# Patient Record
Sex: FEMALE | Race: WHITE | NOT HISPANIC OR LATINO
[De-identification: names, ages, dates, MRNs, and addresses within clinical notes are randomized per-mention and may not be internally consistent; named-entity substitution may affect disease eponyms.]

---

## 2017-01-06 ENCOUNTER — RESULT REVIEW (OUTPATIENT)
Age: 42
End: 2017-01-06

## 2017-01-06 ENCOUNTER — APPOINTMENT (OUTPATIENT)
Dept: HEMATOLOGY ONCOLOGY | Facility: CLINIC | Age: 42
End: 2017-01-06

## 2017-01-06 VITALS
DIASTOLIC BLOOD PRESSURE: 80 MMHG | BODY MASS INDEX: 31.34 KG/M2 | RESPIRATION RATE: 14 BRPM | SYSTOLIC BLOOD PRESSURE: 106 MMHG | HEART RATE: 76 BPM | HEIGHT: 61 IN | WEIGHT: 166 LBS | TEMPERATURE: 97.2 F

## 2017-01-06 LAB
BASOPHILS # BLD: 0.08 TH/MM3
BASOPHILS NFR BLD: 1.3 %
EOSINOPHIL # BLD: 0.14 TH/MM3
EOSINOPHIL NFR BLD: 2.3 %
ERYTHROCYTE [DISTWIDTH] IN BLOOD BY AUTOMATED COUNT: 13.8 %
GRANULOCYTES # BLD: 3.66 TH/MM3
GRANULOCYTES NFR BLD: 61.4 %
HCT VFR BLD AUTO: 37.3 %
HGB BLD-MCNC: 12.4 G/DL
IMM GRANULOCYTES # BLD: 0 TH/MM3
IMM GRANULOCYTES NFR BLD: 0 %
LYMPHOCYTES # BLD: 1.68 TH/MM3
LYMPHOCYTES NFR BLD: 28.1 %
MCH RBC QN AUTO: 27.8 PG
MCHC RBC AUTO-ENTMCNC: 33.2 G/DL
MCV RBC AUTO: 83.6 FL
MONOCYTES # BLD: 0.41 TH/MM3
MONOCYTES NFR BLD: 6.9 %
PLATELET # BLD: 252 TH/MM3
PMV BLD AUTO: 11.4 FL
RBC # BLD AUTO: 4.46 MIL/MM3
RETICS/RBC NFR: 0.72 %
WBC # BLD: 5.97 TH/MM3

## 2017-01-09 ENCOUNTER — MESSAGE (OUTPATIENT)
Age: 42
End: 2017-01-09

## 2017-01-09 LAB
FERRITIN SERPL-MCNC: 10 NG/ML
FOLATE SERPL-MCNC: 14.8 NG/ML
IRON SERPL-MCNC: 47 UG/DL
PERCENT SATURATION (NORTH): 11.3 %
TIBC SERPL-MCNC: 417 UG/DL
TSH SERPL DL<=0.005 MIU/L-ACNC: 2.62 UU/ML
VIT B12 SERPL-MCNC: 171 PG/ML

## 2017-01-12 ENCOUNTER — APPOINTMENT (OUTPATIENT)
Dept: INFUSION THERAPY | Facility: CLINIC | Age: 42
End: 2017-01-12

## 2017-01-12 VITALS
HEART RATE: 81 BPM | TEMPERATURE: 97.3 F | RESPIRATION RATE: 18 BRPM | DIASTOLIC BLOOD PRESSURE: 76 MMHG | SYSTOLIC BLOOD PRESSURE: 104 MMHG

## 2017-01-16 LAB — METHYLMALONATE SERPL-SCNC: 241 NMOL/L

## 2017-01-18 LAB
IF AB SER QL: NEGATIVE
METHYLMALONATE SERPL-SCNC: 265 NMOL/L
PCA AB SER QL: POSITIVE
PCA AB TITR SER: ABNORMAL TITER

## 2017-01-20 ENCOUNTER — APPOINTMENT (OUTPATIENT)
Dept: INFUSION THERAPY | Facility: CLINIC | Age: 42
End: 2017-01-20

## 2017-01-26 ENCOUNTER — APPOINTMENT (OUTPATIENT)
Dept: INFUSION THERAPY | Facility: CLINIC | Age: 42
End: 2017-01-26

## 2017-01-27 ENCOUNTER — APPOINTMENT (OUTPATIENT)
Dept: INFUSION THERAPY | Facility: CLINIC | Age: 42
End: 2017-01-27

## 2017-02-02 ENCOUNTER — APPOINTMENT (OUTPATIENT)
Dept: INFUSION THERAPY | Facility: CLINIC | Age: 42
End: 2017-02-02

## 2017-02-03 ENCOUNTER — APPOINTMENT (OUTPATIENT)
Dept: INFUSION THERAPY | Facility: CLINIC | Age: 42
End: 2017-02-03

## 2017-02-09 ENCOUNTER — APPOINTMENT (OUTPATIENT)
Dept: INFUSION THERAPY | Facility: CLINIC | Age: 42
End: 2017-02-09

## 2017-02-10 ENCOUNTER — APPOINTMENT (OUTPATIENT)
Dept: INFUSION THERAPY | Facility: CLINIC | Age: 42
End: 2017-02-10

## 2017-02-10 VITALS
HEART RATE: 76 BPM | RESPIRATION RATE: 18 BRPM | SYSTOLIC BLOOD PRESSURE: 116 MMHG | DIASTOLIC BLOOD PRESSURE: 80 MMHG | TEMPERATURE: 98.2 F

## 2017-02-17 ENCOUNTER — OUTPATIENT (OUTPATIENT)
Dept: OUTPATIENT SERVICES | Facility: HOSPITAL | Age: 42
LOS: 1 days | Discharge: HOME | End: 2017-02-17

## 2017-02-17 ENCOUNTER — APPOINTMENT (OUTPATIENT)
Dept: INFUSION THERAPY | Facility: CLINIC | Age: 42
End: 2017-02-17

## 2017-02-24 ENCOUNTER — APPOINTMENT (OUTPATIENT)
Dept: HEMATOLOGY ONCOLOGY | Facility: CLINIC | Age: 42
End: 2017-02-24

## 2017-03-03 ENCOUNTER — APPOINTMENT (OUTPATIENT)
Dept: HEMATOLOGY ONCOLOGY | Facility: CLINIC | Age: 42
End: 2017-03-03

## 2017-04-07 ENCOUNTER — APPOINTMENT (OUTPATIENT)
Dept: HEMATOLOGY ONCOLOGY | Facility: CLINIC | Age: 42
End: 2017-04-07

## 2017-06-27 DIAGNOSIS — D50.9 IRON DEFICIENCY ANEMIA, UNSPECIFIED: ICD-10-CM

## 2017-06-27 DIAGNOSIS — Z98.84 BARIATRIC SURGERY STATUS: ICD-10-CM

## 2017-06-27 DIAGNOSIS — K91.2 POSTSURGICAL MALABSORPTION, NOT ELSEWHERE CLASSIFIED: ICD-10-CM

## 2017-08-03 ENCOUNTER — APPOINTMENT (OUTPATIENT)
Dept: HEMATOLOGY ONCOLOGY | Facility: CLINIC | Age: 42
End: 2017-08-03

## 2017-08-03 ENCOUNTER — OUTPATIENT (OUTPATIENT)
Dept: OUTPATIENT SERVICES | Facility: HOSPITAL | Age: 42
LOS: 1 days | Discharge: HOME | End: 2017-08-03

## 2017-08-03 VITALS
HEIGHT: 61 IN | TEMPERATURE: 98.6 F | WEIGHT: 160 LBS | RESPIRATION RATE: 14 BRPM | BODY MASS INDEX: 30.21 KG/M2 | HEART RATE: 81 BPM | DIASTOLIC BLOOD PRESSURE: 89 MMHG | SYSTOLIC BLOOD PRESSURE: 127 MMHG

## 2017-08-03 LAB
BASOPHILS # BLD: 0.08 TH/MM3
BASOPHILS # BLD: 0.09 TH/MM3
BASOPHILS NFR BLD: 1.2 %
BASOPHILS NFR BLD: 1.2 %
EOSINOPHIL # BLD: 0.13 TH/MM3
EOSINOPHIL # BLD: 0.16 TH/MM3
EOSINOPHIL NFR BLD: 1.9 %
EOSINOPHIL NFR BLD: 2.1 %
ERYTHROCYTE [DISTWIDTH] IN BLOOD BY AUTOMATED COUNT: 13.7 %
ERYTHROCYTE [DISTWIDTH] IN BLOOD BY AUTOMATED COUNT: 13.8 %
GRANULOCYTES # BLD: 4.64 TH/MM3
GRANULOCYTES # BLD: 4.95 TH/MM3
GRANULOCYTES NFR BLD: 64.9 %
GRANULOCYTES NFR BLD: 66.8 %
HCT VFR BLD AUTO: 39.9 %
HCT VFR BLD AUTO: 40.6 %
HGB BLD-MCNC: 13.3 G/DL
HGB BLD-MCNC: 13.7 G/DL
IMM GRANULOCYTES # BLD: 0 TH/MM3
IMM GRANULOCYTES # BLD: 0.03 TH/MM3
IMM GRANULOCYTES NFR BLD: 0 %
IMM GRANULOCYTES NFR BLD: 0.4 %
LYMPHOCYTES # BLD: 1.57 TH/MM3
LYMPHOCYTES # BLD: 1.79 TH/MM3
LYMPHOCYTES NFR BLD: 22.6 %
LYMPHOCYTES NFR BLD: 23.5 %
MCH RBC QN AUTO: 28.4 PG
MCH RBC QN AUTO: 28.5 PG
MCHC RBC AUTO-ENTMCNC: 33.3 G/DL
MCHC RBC AUTO-ENTMCNC: 33.7 G/DL
MCV RBC AUTO: 84.4 FL
MCV RBC AUTO: 85.1 FL
MONOCYTES # BLD: 0.52 TH/MM3
MONOCYTES # BLD: 0.6 TH/MM3
MONOCYTES NFR BLD: 7.5 %
MONOCYTES NFR BLD: 7.9 %
PLATELET # BLD: 209 TH/MM3
PLATELET # BLD: 215 TH/MM3
PMV BLD AUTO: 11.5 FL
PMV BLD AUTO: 11.8 FL
RBC # BLD AUTO: 4.69 MIL/MM3
RBC # BLD AUTO: 4.81 MIL/MM3
RETICS/RBC NFR: 1 %
WBC # BLD: 6.94 TH/MM3
WBC # BLD: 7.62 TH/MM3

## 2017-08-04 DIAGNOSIS — D50.9 IRON DEFICIENCY ANEMIA, UNSPECIFIED: ICD-10-CM

## 2017-08-04 LAB
FERRITIN SERPL-MCNC: 15 NG/ML
FOLATE SERPL-MCNC: 11.9 NG/ML
IRON SERPL-MCNC: 60 UG/DL
LACTATE DEHYDROGENASE (NORTH): 166 IU/L
PERCENT SATURATION (NORTH): 15 %
TIBC SERPL-MCNC: 399 UG/DL
VIT B12 SERPL-MCNC: 134 PG/ML

## 2017-08-08 ENCOUNTER — RX RENEWAL (OUTPATIENT)
Age: 42
End: 2017-08-08

## 2017-08-08 LAB — METHYLMALONATE SERPL-SCNC: 236 NMOL/L

## 2017-08-09 ENCOUNTER — RX RENEWAL (OUTPATIENT)
Age: 42
End: 2017-08-09

## 2017-09-05 ENCOUNTER — OUTPATIENT (OUTPATIENT)
Dept: OUTPATIENT SERVICES | Facility: HOSPITAL | Age: 42
LOS: 1 days | Discharge: HOME | End: 2017-09-05

## 2017-09-05 ENCOUNTER — APPOINTMENT (OUTPATIENT)
Dept: HEMATOLOGY ONCOLOGY | Facility: CLINIC | Age: 42
End: 2017-09-05

## 2017-09-05 VITALS
HEIGHT: 61 IN | BODY MASS INDEX: 30.21 KG/M2 | TEMPERATURE: 97.9 F | WEIGHT: 160 LBS | DIASTOLIC BLOOD PRESSURE: 76 MMHG | RESPIRATION RATE: 14 BRPM | SYSTOLIC BLOOD PRESSURE: 116 MMHG | HEART RATE: 63 BPM

## 2017-09-06 LAB
BASOPHILS # BLD: 0.06 TH/MM3
BASOPHILS NFR BLD: 1.1 %
EOSINOPHIL # BLD: 0.1 TH/MM3
EOSINOPHIL NFR BLD: 1.8 %
ERYTHROCYTE [DISTWIDTH] IN BLOOD BY AUTOMATED COUNT: 13.8 %
GRANULOCYTES # BLD: 3.8 TH/MM3
GRANULOCYTES NFR BLD: 66.6 %
HCT VFR BLD AUTO: 39 %
HGB BLD-MCNC: 13.1 G/DL
IMM GRANULOCYTES # BLD: 0.02 TH/MM3
IMM GRANULOCYTES NFR BLD: 0.4 %
LYMPHOCYTES # BLD: 1.28 TH/MM3
LYMPHOCYTES NFR BLD: 22.5 %
MCH RBC QN AUTO: 27.8 PG
MCHC RBC AUTO-ENTMCNC: 33.6 G/DL
MCV RBC AUTO: 82.8 FL
MONOCYTES # BLD: 0.43 TH/MM3
MONOCYTES NFR BLD: 7.6 %
PLATELET # BLD: 138 TH/MM3
PMV BLD AUTO: 12 FL
RBC # BLD AUTO: 4.71 MIL/MM3
WBC # BLD: 5.69 TH/MM3

## 2017-10-02 DIAGNOSIS — D50.9 IRON DEFICIENCY ANEMIA, UNSPECIFIED: ICD-10-CM

## 2017-10-02 DIAGNOSIS — E53.8 DEFICIENCY OF OTHER SPECIFIED B GROUP VITAMINS: ICD-10-CM

## 2017-10-17 ENCOUNTER — APPOINTMENT (OUTPATIENT)
Dept: HEMATOLOGY ONCOLOGY | Facility: CLINIC | Age: 42
End: 2017-10-17

## 2017-10-24 ENCOUNTER — APPOINTMENT (OUTPATIENT)
Dept: HEMATOLOGY ONCOLOGY | Facility: CLINIC | Age: 42
End: 2017-10-24

## 2018-01-10 ENCOUNTER — APPOINTMENT (OUTPATIENT)
Dept: HEMATOLOGY ONCOLOGY | Facility: CLINIC | Age: 43
End: 2018-01-10

## 2018-01-12 ENCOUNTER — APPOINTMENT (OUTPATIENT)
Dept: HEMATOLOGY ONCOLOGY | Facility: CLINIC | Age: 43
End: 2018-01-12

## 2018-01-12 ENCOUNTER — APPOINTMENT (OUTPATIENT)
Dept: INFUSION THERAPY | Facility: CLINIC | Age: 43
End: 2018-01-12

## 2018-01-12 ENCOUNTER — RESULT REVIEW (OUTPATIENT)
Age: 43
End: 2018-01-12

## 2018-01-12 VITALS
DIASTOLIC BLOOD PRESSURE: 72 MMHG | WEIGHT: 159 LBS | SYSTOLIC BLOOD PRESSURE: 109 MMHG | HEART RATE: 73 BPM | TEMPERATURE: 96.8 F | BODY MASS INDEX: 30.02 KG/M2 | HEIGHT: 61 IN | RESPIRATION RATE: 14 BRPM

## 2018-01-13 LAB
FERRITIN SERPL-MCNC: 7 NG/ML
VIT B12 SERPL-MCNC: > 2000 PG/ML

## 2018-01-15 LAB
BASOPHILS # BLD: 0.1 TH/MM3
BASOPHILS NFR BLD: 1.6 %
EOSINOPHIL # BLD: 0.16 TH/MM3
EOSINOPHIL NFR BLD: 2.5 %
ERYTHROCYTE [DISTWIDTH] IN BLOOD BY AUTOMATED COUNT: 14.4 %
GRANULOCYTES # BLD: 3.73 TH/MM3
GRANULOCYTES NFR BLD: 59.3 %
HCT VFR BLD AUTO: 33.2 %
HGB BLD-MCNC: 10.6 G/DL
IMM GRANULOCYTES # BLD: 0.02 TH/MM3
IMM GRANULOCYTES NFR BLD: 0.3 %
IRON SERPL-MCNC: 17 UG/DL
LYMPHOCYTES # BLD: 1.79 TH/MM3
LYMPHOCYTES NFR BLD: 28.4 %
MCH RBC QN AUTO: 23.7 PG
MCHC RBC AUTO-ENTMCNC: 31.9 G/DL
MCV RBC AUTO: 74.1 FL
MONOCYTES # BLD: 0.5 TH/MM3
MONOCYTES NFR BLD: 7.9 %
PLATELET # BLD: 220 TH/MM3
PMV BLD AUTO: 12.1 FL
RBC # BLD AUTO: 4.48 MIL/MM3
TIBC SERPL-MCNC: 438 UG/DL
WBC # BLD: 6.3 TH/MM3

## 2018-01-17 ENCOUNTER — APPOINTMENT (OUTPATIENT)
Dept: INFUSION THERAPY | Facility: CLINIC | Age: 43
End: 2018-01-17

## 2018-01-17 ENCOUNTER — APPOINTMENT (OUTPATIENT)
Dept: HEMATOLOGY ONCOLOGY | Facility: CLINIC | Age: 43
End: 2018-01-17

## 2018-01-19 LAB — METHYLMALONATE SERPL-SCNC: 123 NMOL/L

## 2018-01-24 ENCOUNTER — APPOINTMENT (OUTPATIENT)
Dept: INFUSION THERAPY | Facility: CLINIC | Age: 43
End: 2018-01-24

## 2018-01-31 ENCOUNTER — APPOINTMENT (OUTPATIENT)
Dept: INFUSION THERAPY | Facility: CLINIC | Age: 43
End: 2018-01-31

## 2018-02-14 ENCOUNTER — APPOINTMENT (OUTPATIENT)
Dept: INFUSION THERAPY | Facility: CLINIC | Age: 43
End: 2018-02-14

## 2018-02-14 ENCOUNTER — OUTPATIENT (OUTPATIENT)
Dept: OUTPATIENT SERVICES | Facility: HOSPITAL | Age: 43
LOS: 1 days | Discharge: HOME | End: 2018-02-14

## 2018-02-14 DIAGNOSIS — E53.8 DEFICIENCY OF OTHER SPECIFIED B GROUP VITAMINS: ICD-10-CM

## 2018-02-14 RX ORDER — ACETAMINOPHEN 500 MG
650 TABLET ORAL ONCE
Qty: 0 | Refills: 0 | Status: COMPLETED | OUTPATIENT
Start: 2018-02-14 | End: 2018-02-14

## 2018-02-14 RX ORDER — IRON SUCROSE 20 MG/ML
200 INJECTION, SOLUTION INTRAVENOUS ONCE
Qty: 0 | Refills: 0 | Status: COMPLETED | OUTPATIENT
Start: 2018-02-14 | End: 2018-02-14

## 2018-02-14 RX ADMIN — IRON SUCROSE 260 MILLIGRAM(S): 20 INJECTION, SOLUTION INTRAVENOUS at 17:31

## 2018-02-14 RX ADMIN — Medication 650 MILLIGRAM(S): at 17:32

## 2018-02-21 ENCOUNTER — APPOINTMENT (OUTPATIENT)
Dept: HEMATOLOGY ONCOLOGY | Facility: CLINIC | Age: 43
End: 2018-02-21

## 2018-04-04 ENCOUNTER — APPOINTMENT (OUTPATIENT)
Dept: HEMATOLOGY ONCOLOGY | Facility: CLINIC | Age: 43
End: 2018-04-04

## 2018-04-24 ENCOUNTER — RESULT REVIEW (OUTPATIENT)
Age: 43
End: 2018-04-24

## 2018-11-20 ENCOUNTER — APPOINTMENT (OUTPATIENT)
Dept: HEMATOLOGY ONCOLOGY | Facility: CLINIC | Age: 43
End: 2018-11-20

## 2019-01-02 ENCOUNTER — LABORATORY RESULT (OUTPATIENT)
Age: 44
End: 2019-01-02

## 2019-01-02 ENCOUNTER — APPOINTMENT (OUTPATIENT)
Dept: HEMATOLOGY ONCOLOGY | Facility: CLINIC | Age: 44
End: 2019-01-02

## 2019-01-02 VITALS
TEMPERATURE: 97.4 F | BODY MASS INDEX: 32.85 KG/M2 | HEIGHT: 61 IN | SYSTOLIC BLOOD PRESSURE: 116 MMHG | HEART RATE: 79 BPM | DIASTOLIC BLOOD PRESSURE: 82 MMHG | WEIGHT: 174 LBS | RESPIRATION RATE: 14 BRPM

## 2019-01-02 NOTE — HISTORY OF PRESENT ILLNESS
[de-identified] : Radha has h/o iron deficiency and B12 deficiency, she is a former patient of Dr. Rivero and presents today for follow up visit. She has no new complaints today. As per Dr. Rivero's note she has had a negative GI evaluation in the past. On 8/3/2017 her CBC and iron studies, were WNL, B12 level was low. 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 She also reports heavy menstrual blood loss.  [de-identified] : 9/5/2017: Radah presents for follow up. She is a former patient of Dr. Rivero. She has h/o B12 deficiency and receives B12 injections.  \par \par 1/12/2018: Will repeat labwork today and offer iron supplementation with Venofer starting today, as I am suspecting she is again deficient in iron. She continues on B12 supplementation, she self-injects at home. \par \par 1/2/2/2019: Since her last visit Radha has received 5 doses of Venofer for iron deficiency. She also had several missed appointments. She reports she has not been injecting B12, just taking oral supplementation. She was out of syringes. She reports she has had extensive GI work up in the past and was told that she has problems with malabsorption. She reports shortness of breath, fatigue today.

## 2019-01-02 NOTE — PHYSICAL EXAM
[Fully active, able to carry on all pre-disease performance without restriction] : Status 0 - Fully active, able to carry on all pre-disease performance without restriction [Normal] : normoactive bowel sounds, soft and nontender, no hepatosplenomegaly or masses appreciated

## 2019-01-02 NOTE — ASSESSMENT
[FreeTextEntry1] : History of B12 deficiency and iron deficiency, patient is on B12 supplementation (not taking) and has received IV iron in the past.\par --CBC today, iron studies, B12 level today\par --Start on iron supplementation with Venofer\par --Continue on home B12 supplementation, injection will be prescribed with syringes\par \par -- Follow up in 3 months\par She will follow up with her PMD and undergo age appropriate screening. \par \par

## 2019-01-03 ENCOUNTER — RX RENEWAL (OUTPATIENT)
Age: 44
End: 2019-01-03

## 2019-01-04 LAB
ALBUMIN SERPL ELPH-MCNC: 4.4 G/DL
ALP BLD-CCNC: 95 U/L
ALT SERPL-CCNC: 26 U/L
ANION GAP SERPL CALC-SCNC: 15 MMOL/L
AST SERPL-CCNC: 22 U/L
BILIRUB SERPL-MCNC: <0.2 MG/DL
BUN SERPL-MCNC: 12 MG/DL
CALCIUM SERPL-MCNC: 9.1 MG/DL
CHLORIDE SERPL-SCNC: 102 MMOL/L
CO2 SERPL-SCNC: 24 MMOL/L
CREAT SERPL-MCNC: 0.6 MG/DL
FERRITIN SERPL-MCNC: 4 NG/ML
FOLATE SERPL-MCNC: 18.7 NG/ML
GLUCOSE SERPL-MCNC: 76 MG/DL
HCT VFR BLD CALC: 31.8 %
HGB BLD-MCNC: 9.6 G/DL
IRON SATN MFR SERPL: 4 %
IRON SERPL-MCNC: 17 UG/DL
MCHC RBC-ENTMCNC: 21.8 PG
MCHC RBC-ENTMCNC: 30.2 G/DL
MCV RBC AUTO: 72.1 FL
PLATELET # BLD AUTO: 284 K/UL
PMV BLD: 10.9 FL
POTASSIUM SERPL-SCNC: 4.8 MMOL/L
PROT SERPL-MCNC: 6.8 G/DL
RBC # BLD: 4.41 M/UL
RBC # FLD: 14.7 %
SODIUM SERPL-SCNC: 141 MMOL/L
TIBC SERPL-MCNC: 424 UG/DL
UIBC SERPL-MCNC: 407 UG/DL
VIT B12 SERPL-MCNC: 296 PG/ML
WBC # FLD AUTO: 7.24 K/UL

## 2019-01-09 LAB — METHYLMALONATE SERPL-SCNC: 107 NMOL/L

## 2019-01-11 ENCOUNTER — APPOINTMENT (OUTPATIENT)
Dept: INFUSION THERAPY | Facility: CLINIC | Age: 44
End: 2019-01-11

## 2019-01-11 RX ORDER — IRON SUCROSE 20 MG/ML
200 INJECTION, SOLUTION INTRAVENOUS ONCE
Qty: 0 | Refills: 0 | Status: COMPLETED | OUTPATIENT
Start: 2019-01-11 | End: 2019-01-11

## 2019-01-11 RX ORDER — PREGABALIN 225 MG/1
1000 CAPSULE ORAL ONCE
Qty: 0 | Refills: 0 | Status: COMPLETED | OUTPATIENT
Start: 2019-01-11 | End: 2019-01-11

## 2019-01-11 RX ORDER — ACETAMINOPHEN 500 MG
650 TABLET ORAL ONCE
Qty: 0 | Refills: 0 | Status: COMPLETED | OUTPATIENT
Start: 2019-01-11 | End: 2019-01-11

## 2019-01-11 RX ADMIN — PREGABALIN 1000 MICROGRAM(S): 225 CAPSULE ORAL at 10:57

## 2019-01-11 RX ADMIN — Medication 650 MILLIGRAM(S): at 10:58

## 2019-01-11 RX ADMIN — Medication 650 MILLIGRAM(S): at 10:57

## 2019-01-11 RX ADMIN — IRON SUCROSE 220 MILLIGRAM(S): 20 INJECTION, SOLUTION INTRAVENOUS at 10:57

## 2019-01-18 ENCOUNTER — APPOINTMENT (OUTPATIENT)
Dept: INFUSION THERAPY | Facility: CLINIC | Age: 44
End: 2019-01-18

## 2019-01-18 RX ORDER — IRON SUCROSE 20 MG/ML
200 INJECTION, SOLUTION INTRAVENOUS ONCE
Qty: 0 | Refills: 0 | Status: COMPLETED | OUTPATIENT
Start: 2019-01-18 | End: 2019-01-18

## 2019-01-18 RX ORDER — PREGABALIN 225 MG/1
1000 CAPSULE ORAL ONCE
Qty: 0 | Refills: 0 | Status: COMPLETED | OUTPATIENT
Start: 2019-01-18 | End: 2019-01-18

## 2019-01-18 RX ORDER — ACETAMINOPHEN 500 MG
650 TABLET ORAL ONCE
Qty: 0 | Refills: 0 | Status: COMPLETED | OUTPATIENT
Start: 2019-01-18 | End: 2019-01-18

## 2019-01-18 RX ADMIN — IRON SUCROSE 220 MILLIGRAM(S): 20 INJECTION, SOLUTION INTRAVENOUS at 10:44

## 2019-01-18 RX ADMIN — Medication 650 MILLIGRAM(S): at 10:43

## 2019-01-18 RX ADMIN — PREGABALIN 1000 MICROGRAM(S): 225 CAPSULE ORAL at 10:43

## 2019-01-18 RX ADMIN — Medication 650 MILLIGRAM(S): at 10:44

## 2019-01-25 ENCOUNTER — APPOINTMENT (OUTPATIENT)
Dept: INFUSION THERAPY | Facility: CLINIC | Age: 44
End: 2019-01-25

## 2019-01-25 RX ORDER — ACETAMINOPHEN 500 MG
650 TABLET ORAL ONCE
Qty: 0 | Refills: 0 | Status: COMPLETED | OUTPATIENT
Start: 2019-01-25 | End: 2019-01-25

## 2019-01-25 RX ORDER — IRON SUCROSE 20 MG/ML
200 INJECTION, SOLUTION INTRAVENOUS ONCE
Qty: 0 | Refills: 0 | Status: COMPLETED | OUTPATIENT
Start: 2019-01-25 | End: 2019-01-25

## 2019-01-25 RX ORDER — PREGABALIN 225 MG/1
1000 CAPSULE ORAL ONCE
Qty: 0 | Refills: 0 | Status: COMPLETED | OUTPATIENT
Start: 2019-01-25 | End: 2019-01-25

## 2019-01-25 RX ADMIN — Medication 650 MILLIGRAM(S): at 11:31

## 2019-01-25 RX ADMIN — Medication 650 MILLIGRAM(S): at 11:32

## 2019-01-25 RX ADMIN — PREGABALIN 1000 MICROGRAM(S): 225 CAPSULE ORAL at 11:31

## 2019-01-25 RX ADMIN — IRON SUCROSE 220 MILLIGRAM(S): 20 INJECTION, SOLUTION INTRAVENOUS at 11:31

## 2019-02-01 ENCOUNTER — APPOINTMENT (OUTPATIENT)
Dept: INFUSION THERAPY | Facility: CLINIC | Age: 44
End: 2019-02-01

## 2019-02-01 RX ORDER — ACETAMINOPHEN 500 MG
650 TABLET ORAL ONCE
Qty: 0 | Refills: 0 | Status: COMPLETED | OUTPATIENT
Start: 2019-02-01 | End: 2019-02-01

## 2019-02-01 RX ORDER — PREGABALIN 225 MG/1
1000 CAPSULE ORAL ONCE
Qty: 0 | Refills: 0 | Status: COMPLETED | OUTPATIENT
Start: 2019-02-01 | End: 2019-02-01

## 2019-02-01 RX ORDER — IRON SUCROSE 20 MG/ML
200 INJECTION, SOLUTION INTRAVENOUS ONCE
Qty: 0 | Refills: 0 | Status: COMPLETED | OUTPATIENT
Start: 2019-02-01 | End: 2019-02-01

## 2019-02-01 RX ADMIN — PREGABALIN 1000 MICROGRAM(S): 225 CAPSULE ORAL at 10:42

## 2019-02-01 RX ADMIN — Medication 650 MILLIGRAM(S): at 10:42

## 2019-02-01 RX ADMIN — IRON SUCROSE 220 MILLIGRAM(S): 20 INJECTION, SOLUTION INTRAVENOUS at 10:41

## 2019-02-01 RX ADMIN — Medication 650 MILLIGRAM(S): at 10:41

## 2019-02-20 ENCOUNTER — APPOINTMENT (OUTPATIENT)
Dept: INFUSION THERAPY | Facility: CLINIC | Age: 44
End: 2019-02-20

## 2019-02-20 RX ORDER — PREGABALIN 225 MG/1
1000 CAPSULE ORAL ONCE
Qty: 0 | Refills: 0 | Status: DISCONTINUED | OUTPATIENT
Start: 2019-02-20 | End: 2019-02-20

## 2019-02-20 RX ORDER — IRON SUCROSE 20 MG/ML
200 INJECTION, SOLUTION INTRAVENOUS ONCE
Qty: 0 | Refills: 0 | Status: COMPLETED | OUTPATIENT
Start: 2019-02-20 | End: 2019-02-20

## 2019-02-20 RX ORDER — ACETAMINOPHEN 500 MG
650 TABLET ORAL ONCE
Qty: 0 | Refills: 0 | Status: COMPLETED | OUTPATIENT
Start: 2019-02-20 | End: 2019-02-20

## 2019-02-20 RX ADMIN — IRON SUCROSE 220 MILLIGRAM(S): 20 INJECTION, SOLUTION INTRAVENOUS at 09:06

## 2019-02-20 RX ADMIN — Medication 650 MILLIGRAM(S): at 09:06

## 2019-02-22 ENCOUNTER — APPOINTMENT (OUTPATIENT)
Dept: HEMATOLOGY ONCOLOGY | Facility: CLINIC | Age: 44
End: 2019-02-22

## 2019-03-01 ENCOUNTER — LABORATORY RESULT (OUTPATIENT)
Age: 44
End: 2019-03-01

## 2019-03-01 ENCOUNTER — APPOINTMENT (OUTPATIENT)
Dept: INFUSION THERAPY | Facility: CLINIC | Age: 44
End: 2019-03-01

## 2019-03-01 ENCOUNTER — OUTPATIENT (OUTPATIENT)
Dept: OUTPATIENT SERVICES | Facility: HOSPITAL | Age: 44
LOS: 1 days | Discharge: HOME | End: 2019-03-01

## 2019-03-01 DIAGNOSIS — D50.9 IRON DEFICIENCY ANEMIA, UNSPECIFIED: ICD-10-CM

## 2019-03-01 DIAGNOSIS — E53.8 DEFICIENCY OF OTHER SPECIFIED B GROUP VITAMINS: ICD-10-CM

## 2019-03-01 RX ORDER — PREGABALIN 225 MG/1
1000 CAPSULE ORAL ONCE
Qty: 0 | Refills: 0 | Status: COMPLETED | OUTPATIENT
Start: 2019-03-01 | End: 2019-03-01

## 2019-03-01 RX ADMIN — PREGABALIN 1000 MICROGRAM(S): 225 CAPSULE ORAL at 12:25

## 2019-03-04 LAB
FERRITIN SERPL-MCNC: 147 NG/ML
HCT VFR BLD CALC: 41.8 %
HGB BLD-MCNC: 13.1 G/DL
IRON SATN MFR SERPL: 39 %
IRON SERPL-MCNC: 132 UG/DL
MCHC RBC-ENTMCNC: 24.8 PG
MCHC RBC-ENTMCNC: 31.3 G/DL
MCV RBC AUTO: 79.2 FL
PLATELET # BLD AUTO: 222 K/UL
PMV BLD: 10.8 FL
RBC # BLD: 5.28 M/UL
RBC # FLD: 23.8 %
TIBC SERPL-MCNC: 336 UG/DL
UIBC SERPL-MCNC: 204 UG/DL
VIT B12 SERPL-MCNC: 562 PG/ML
WBC # FLD AUTO: 7.5 K/UL

## 2019-04-10 ENCOUNTER — APPOINTMENT (OUTPATIENT)
Dept: HEMATOLOGY ONCOLOGY | Facility: CLINIC | Age: 44
End: 2019-04-10

## 2019-11-05 ENCOUNTER — LABORATORY RESULT (OUTPATIENT)
Age: 44
End: 2019-11-05

## 2019-11-05 ENCOUNTER — APPOINTMENT (OUTPATIENT)
Dept: HEMATOLOGY ONCOLOGY | Facility: CLINIC | Age: 44
End: 2019-11-05
Payer: COMMERCIAL

## 2019-11-05 VITALS
DIASTOLIC BLOOD PRESSURE: 88 MMHG | HEART RATE: 87 BPM | BODY MASS INDEX: 27.94 KG/M2 | SYSTOLIC BLOOD PRESSURE: 152 MMHG | TEMPERATURE: 98 F | WEIGHT: 148 LBS | RESPIRATION RATE: 14 BRPM | HEIGHT: 61 IN

## 2019-11-05 DIAGNOSIS — Z00.00 ENCOUNTER FOR GENERAL ADULT MEDICAL EXAMINATION W/OUT ABNORMAL FINDINGS: ICD-10-CM

## 2019-11-05 PROCEDURE — 99213 OFFICE O/P EST LOW 20 MIN: CPT

## 2019-11-05 NOTE — HISTORY OF PRESENT ILLNESS
[de-identified] : 9/5/2017: Radha presents for follow up. She is a former patient of Dr. Rivero. She has h/o B12 deficiency and receives B12 injections.  \par \par 1/12/2018: Will repeat labwork today and offer iron supplementation with Venofer starting today, as I am suspecting she is again deficient in iron. She continues on B12 supplementation, she self-injects at home. \par \par 1/2/2/2019: Since her last visit Radha has received 5 doses of Venofer for iron deficiency. She also had several missed appointments. She reports she has not been injecting B12, just taking oral supplementation. She was out of syringes. She reports she has had extensive GI work up in the past and was told that she has problems with malabsorption. She reports shortness of breath, fatigue today. \par \par 11/5/19: Patient came for a follow up visit, she reports feeling tired, she reports heavy menstrual periods intermittently, they have been more pronounced lately. We communicated CBC from today with [facility] 10.3/33., MCV is 76.6, we will follow up on Ferritin level and vit B12 level, replete if needed. \par Patient also reported weight loss, she has followed keto and intermittent fasting for intentional weigh loss, lost around 20 pounds, she reports she has gained 5lbs back over past month, when she was not adherent to the diet. Her blood pressure, was noted to be higher than baseline today, she will touch base with her PMD ASAP.  [de-identified] : Radha has h/o iron deficiency and B12 deficiency, she is a former patient of Dr. Rivero and presents today for follow up visit. She has no new complaints today. As per Dr. Rivero's note she has had a negative GI evaluation in the past. On 8/3/2017 her CBC and iron studies, were WNL, B12 level was low. 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 She also reports heavy menstrual blood loss.

## 2019-11-05 NOTE — ASSESSMENT
[FreeTextEntry1] : History of B12 deficiency and iron deficiency, patient is on B12 supplementation (not taking) and has received IV iron in the past.\par --CBC today, iron studies, B12 level today\par --We will determine if she will rewquire iron supplementation with Venofer\par --Continue on home B12 supplementation, injection will be prescribed with syringes\par --Follow up with PMD for high blood pressure \par -- Follow up in 3 months\par She will follow up with her PMD and undergo age appropriate screening. \par \par patient seen and examined by Dr Valentin who agreed for the above plan.

## 2019-11-08 ENCOUNTER — APPOINTMENT (OUTPATIENT)
Dept: INFUSION THERAPY | Facility: CLINIC | Age: 44
End: 2019-11-08

## 2019-11-08 ENCOUNTER — RX RENEWAL (OUTPATIENT)
Age: 44
End: 2019-11-08

## 2019-11-08 RX ORDER — IRON SUCROSE 20 MG/ML
200 INJECTION, SOLUTION INTRAVENOUS ONCE
Refills: 0 | Status: COMPLETED | OUTPATIENT
Start: 2019-11-08 | End: 2019-11-08

## 2019-11-08 RX ORDER — ACETAMINOPHEN 500 MG
650 TABLET ORAL ONCE
Refills: 0 | Status: COMPLETED | OUTPATIENT
Start: 2019-11-08 | End: 2019-11-08

## 2019-11-08 RX ADMIN — IRON SUCROSE 220 MILLIGRAM(S): 20 INJECTION, SOLUTION INTRAVENOUS at 13:30

## 2019-11-08 RX ADMIN — Medication 650 MILLIGRAM(S): at 13:31

## 2019-11-08 RX ADMIN — Medication 650 MILLIGRAM(S): at 13:32

## 2019-11-08 RX ADMIN — IRON SUCROSE 200 MILLIGRAM(S): 20 INJECTION, SOLUTION INTRAVENOUS at 14:00

## 2019-11-14 ENCOUNTER — APPOINTMENT (OUTPATIENT)
Dept: INFUSION THERAPY | Facility: CLINIC | Age: 44
End: 2019-11-14

## 2019-11-14 RX ORDER — ACETAMINOPHEN 500 MG
650 TABLET ORAL ONCE
Refills: 0 | Status: COMPLETED | OUTPATIENT
Start: 2019-11-14 | End: 2019-11-14

## 2019-11-14 RX ORDER — IRON SUCROSE 20 MG/ML
200 INJECTION, SOLUTION INTRAVENOUS ONCE
Refills: 0 | Status: COMPLETED | OUTPATIENT
Start: 2019-11-14 | End: 2019-11-14

## 2019-11-14 RX ADMIN — IRON SUCROSE 220 MILLIGRAM(S): 20 INJECTION, SOLUTION INTRAVENOUS at 16:50

## 2019-11-14 RX ADMIN — Medication 650 MILLIGRAM(S): at 16:34

## 2019-11-14 RX ADMIN — Medication 650 MILLIGRAM(S): at 16:35

## 2019-11-14 RX ADMIN — IRON SUCROSE 200 MILLIGRAM(S): 20 INJECTION, SOLUTION INTRAVENOUS at 17:30

## 2019-11-15 LAB
ALBUMIN SERPL ELPH-MCNC: 4.6 G/DL
ALP BLD-CCNC: 93 U/L
ALT SERPL-CCNC: 16 U/L
ANION GAP SERPL CALC-SCNC: 12 MMOL/L
AST SERPL-CCNC: 20 U/L
BILIRUB SERPL-MCNC: 0.2 MG/DL
BUN SERPL-MCNC: 6 MG/DL
CALCIUM SERPL-MCNC: 9.2 MG/DL
CHLORIDE SERPL-SCNC: 101 MMOL/L
CO2 SERPL-SCNC: 25 MMOL/L
CREAT SERPL-MCNC: 0.5 MG/DL
FERRITIN SERPL-MCNC: 4 NG/ML
FOLATE SERPL-MCNC: 9.3 NG/ML
GLUCOSE SERPL-MCNC: 77 MG/DL
HCT VFR BLD CALC: 33.1 %
HGB BLD-MCNC: 10.3 G/DL
IRON SATN MFR SERPL: 6 %
IRON SERPL-MCNC: 23 UG/DL
MCHC RBC-ENTMCNC: 23.8 PG
MCHC RBC-ENTMCNC: 31.1 G/DL
MCV RBC AUTO: 76.6 FL
METHYLMALONATE SERPL-SCNC: 118 NMOL/L
PLATELET # BLD AUTO: 267 K/UL
PMV BLD: 12.1 FL
POTASSIUM SERPL-SCNC: 3.8 MMOL/L
PROT SERPL-MCNC: 7 G/DL
RBC # BLD: 4.32 M/UL
RBC # FLD: 13.4 %
SODIUM SERPL-SCNC: 138 MMOL/L
TIBC SERPL-MCNC: 403 UG/DL
UIBC SERPL-MCNC: 380 UG/DL
VIT B12 SERPL-MCNC: 324 PG/ML
WBC # FLD AUTO: 5.28 K/UL

## 2019-11-21 ENCOUNTER — APPOINTMENT (OUTPATIENT)
Dept: INFUSION THERAPY | Facility: CLINIC | Age: 44
End: 2019-11-21

## 2019-11-21 RX ORDER — IRON SUCROSE 20 MG/ML
200 INJECTION, SOLUTION INTRAVENOUS ONCE
Refills: 0 | Status: COMPLETED | OUTPATIENT
Start: 2019-11-21 | End: 2019-11-21

## 2019-11-21 RX ADMIN — IRON SUCROSE 220 MILLIGRAM(S): 20 INJECTION, SOLUTION INTRAVENOUS at 16:10

## 2019-11-21 RX ADMIN — IRON SUCROSE 200 MILLIGRAM(S): 20 INJECTION, SOLUTION INTRAVENOUS at 16:40

## 2019-11-22 ENCOUNTER — APPOINTMENT (OUTPATIENT)
Dept: INFUSION THERAPY | Facility: CLINIC | Age: 44
End: 2019-11-22

## 2019-11-29 ENCOUNTER — APPOINTMENT (OUTPATIENT)
Dept: INFUSION THERAPY | Facility: CLINIC | Age: 44
End: 2019-11-29

## 2019-12-05 ENCOUNTER — APPOINTMENT (OUTPATIENT)
Dept: INFUSION THERAPY | Facility: CLINIC | Age: 44
End: 2019-12-05

## 2019-12-05 RX ORDER — ACETAMINOPHEN 500 MG
650 TABLET ORAL ONCE
Refills: 0 | Status: COMPLETED | OUTPATIENT
Start: 2019-12-05 | End: 2019-12-05

## 2019-12-05 RX ORDER — IRON SUCROSE 20 MG/ML
200 INJECTION, SOLUTION INTRAVENOUS ONCE
Refills: 0 | Status: COMPLETED | OUTPATIENT
Start: 2019-12-05 | End: 2019-12-05

## 2019-12-05 RX ADMIN — Medication 650 MILLIGRAM(S): at 16:12

## 2019-12-05 RX ADMIN — IRON SUCROSE 220 MILLIGRAM(S): 20 INJECTION, SOLUTION INTRAVENOUS at 16:11

## 2019-12-05 RX ADMIN — Medication 650 MILLIGRAM(S): at 16:11

## 2019-12-05 RX ADMIN — IRON SUCROSE 200 MILLIGRAM(S): 20 INJECTION, SOLUTION INTRAVENOUS at 16:45

## 2019-12-06 ENCOUNTER — APPOINTMENT (OUTPATIENT)
Dept: INFUSION THERAPY | Facility: CLINIC | Age: 44
End: 2019-12-06

## 2019-12-12 ENCOUNTER — OUTPATIENT (OUTPATIENT)
Dept: OUTPATIENT SERVICES | Facility: HOSPITAL | Age: 44
LOS: 1 days | Discharge: HOME | End: 2019-12-12

## 2019-12-12 ENCOUNTER — APPOINTMENT (OUTPATIENT)
Dept: INFUSION THERAPY | Facility: CLINIC | Age: 44
End: 2019-12-12

## 2019-12-12 DIAGNOSIS — E53.8 DEFICIENCY OF OTHER SPECIFIED B GROUP VITAMINS: ICD-10-CM

## 2019-12-12 DIAGNOSIS — D50.9 IRON DEFICIENCY ANEMIA, UNSPECIFIED: ICD-10-CM

## 2019-12-12 RX ORDER — IRON SUCROSE 20 MG/ML
200 INJECTION, SOLUTION INTRAVENOUS ONCE
Refills: 0 | Status: COMPLETED | OUTPATIENT
Start: 2019-12-12 | End: 2019-12-12

## 2019-12-12 RX ORDER — ACETAMINOPHEN 500 MG
650 TABLET ORAL ONCE
Refills: 0 | Status: COMPLETED | OUTPATIENT
Start: 2019-12-12 | End: 2019-12-12

## 2019-12-12 RX ADMIN — IRON SUCROSE 220 MILLIGRAM(S): 20 INJECTION, SOLUTION INTRAVENOUS at 16:16

## 2019-12-12 RX ADMIN — IRON SUCROSE 200 MILLIGRAM(S): 20 INJECTION, SOLUTION INTRAVENOUS at 16:50

## 2019-12-12 RX ADMIN — Medication 650 MILLIGRAM(S): at 16:17

## 2020-03-06 ENCOUNTER — APPOINTMENT (OUTPATIENT)
Dept: HEMATOLOGY ONCOLOGY | Facility: CLINIC | Age: 45
End: 2020-03-06

## 2020-03-19 ENCOUNTER — APPOINTMENT (OUTPATIENT)
Dept: HEMATOLOGY ONCOLOGY | Facility: CLINIC | Age: 45
End: 2020-03-19

## 2020-08-24 ENCOUNTER — LABORATORY RESULT (OUTPATIENT)
Age: 45
End: 2020-08-24

## 2020-08-24 ENCOUNTER — APPOINTMENT (OUTPATIENT)
Dept: HEMATOLOGY ONCOLOGY | Facility: CLINIC | Age: 45
End: 2020-08-24
Payer: COMMERCIAL

## 2020-08-24 VITALS
BODY MASS INDEX: 27.56 KG/M2 | HEART RATE: 87 BPM | TEMPERATURE: 96.3 F | HEIGHT: 61 IN | DIASTOLIC BLOOD PRESSURE: 97 MMHG | SYSTOLIC BLOOD PRESSURE: 122 MMHG | WEIGHT: 146 LBS

## 2020-08-24 PROCEDURE — 99212 OFFICE O/P EST SF 10 MIN: CPT

## 2020-08-24 RX ORDER — CYANOCOBALAMIN
1000 KIT
Qty: 4 | Refills: 0 | Status: COMPLETED | COMMUNITY
Start: 2019-01-03 | End: 2021-01-31

## 2020-08-25 LAB
FERRITIN SERPL-MCNC: 4 NG/ML
HCT VFR BLD CALC: 34.5 %
HGB BLD-MCNC: 10.6 G/DL
IRON SATN MFR SERPL: 6 %
IRON SERPL-MCNC: 25 UG/DL
MCHC RBC-ENTMCNC: 23.8 PG
MCHC RBC-ENTMCNC: 30.7 G/DL
MCV RBC AUTO: 77.4 FL
PLATELET # BLD AUTO: 284 K/UL
PMV BLD: 12.1 FL
RBC # BLD: 4.46 M/UL
RBC # FLD: 13.9 %
TIBC SERPL-MCNC: 427 UG/DL
UIBC SERPL-MCNC: 402 UG/DL
VIT B12 SERPL-MCNC: 262 PG/ML
WBC # FLD AUTO: 7.48 K/UL

## 2020-08-26 ENCOUNTER — APPOINTMENT (OUTPATIENT)
Dept: INFUSION THERAPY | Facility: CLINIC | Age: 45
End: 2020-08-26

## 2020-08-26 RX ORDER — ACETAMINOPHEN 500 MG
650 TABLET ORAL ONCE
Refills: 0 | Status: COMPLETED | OUTPATIENT
Start: 2020-08-26 | End: 2020-08-26

## 2020-08-26 RX ORDER — IRON SUCROSE 20 MG/ML
200 INJECTION, SOLUTION INTRAVENOUS ONCE
Refills: 0 | Status: COMPLETED | OUTPATIENT
Start: 2020-08-26 | End: 2020-08-26

## 2020-08-26 RX ADMIN — Medication 650 MILLIGRAM(S): at 14:46

## 2020-08-26 RX ADMIN — IRON SUCROSE 200 MILLIGRAM(S): 20 INJECTION, SOLUTION INTRAVENOUS at 15:15

## 2020-08-26 RX ADMIN — IRON SUCROSE 220 MILLIGRAM(S): 20 INJECTION, SOLUTION INTRAVENOUS at 14:46

## 2020-09-02 ENCOUNTER — APPOINTMENT (OUTPATIENT)
Dept: INFUSION THERAPY | Facility: CLINIC | Age: 45
End: 2020-09-02

## 2020-09-02 RX ORDER — ACETAMINOPHEN 500 MG
650 TABLET ORAL ONCE
Refills: 0 | Status: COMPLETED | OUTPATIENT
Start: 2020-09-02 | End: 2020-09-02

## 2020-09-02 RX ORDER — IRON SUCROSE 20 MG/ML
200 INJECTION, SOLUTION INTRAVENOUS ONCE
Refills: 0 | Status: COMPLETED | OUTPATIENT
Start: 2020-09-02 | End: 2020-09-02

## 2020-09-02 RX ADMIN — Medication 650 MILLIGRAM(S): at 15:33

## 2020-09-02 RX ADMIN — IRON SUCROSE 220 MILLIGRAM(S): 20 INJECTION, SOLUTION INTRAVENOUS at 15:25

## 2020-09-02 RX ADMIN — IRON SUCROSE 200 MILLIGRAM(S): 20 INJECTION, SOLUTION INTRAVENOUS at 16:03

## 2020-09-05 NOTE — ASSESSMENT
[FreeTextEntry1] : History of B12 deficiency and iron deficiency, patient is on B12 supplementation  and has received IV iron in the past.\par --CBC today, iron studies, B12 level today\par --We will determine if she will require iron supplementation with Venofer.\par --Continue on home B12 supplementation, injection will be renewed with syringes.\par --Follow up with PMD for high blood pressure. \par -- Follow up in 3 months\par She will follow up with her PMD and undergo age appropriate screening. \par \par Patient seen and examined by Dr Valentin who agreed for the above plan.

## 2020-09-05 NOTE — HISTORY OF PRESENT ILLNESS
[de-identified] : 9/5/2017: Radha presents for follow up. She is a former patient of Dr. Rivero. She has h/o B12 deficiency and receives B12 injections.  \par \par 1/12/2018: Will repeat labwork today and offer iron supplementation with Venofer starting today, as I am suspecting she is again deficient in iron. She continues on B12 supplementation, she self-injects at home. \par \par 1/2/2/2019: Since her last visit Radha has received 5 doses of Venofer for iron deficiency. She also had several missed appointments. She reports she has not been injecting B12, just taking oral supplementation. She was out of syringes. She reports she has had extensive GI work up in the past and was told that she has problems with malabsorption. She reports shortness of breath, fatigue today. \par \par 11/5/19: Patient came for a follow up visit, she reports feeling tired, she reports heavy menstrual periods intermittently, they have been more pronounced lately. We communicated CBC from today with [facility] 10.3/33., MCV is 76.6, we will follow up on Ferritin level and vit B12 level, replete if needed. \par Patient also reported weight loss, she has followed keto and intermittent fasting for intentional weigh loss, lost around 20 pounds, she reports she has gained 5lbs back over past month, when she was not adherent to the diet. Her blood pressure, was noted to be higher than baseline today, she will touch base with her PMD ASAP. \par \par 8/24/2020: Patient here for follow up visit for anemia secondary to iron deficiency and B12 deficiency.  She recently started to have headaches, feel more fatigued and SOB at times.  Her periods have also been more frequent (every 14 days) last couple of months.  Patient denies cough, fever, chills, night sweats or bone pain.  She continues on B12 injections monthly at home.\par  [de-identified] : Radha has h/o iron deficiency and B12 deficiency, she is a former patient of Dr. Rivero and presents today for follow up visit. She has no new complaints today. As per Dr. Rivero's note she has had a negative GI evaluation in the past. On 8/3/2017 her CBC and iron studies, were WNL, B12 level was low. 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 She also reports heavy menstrual blood loss.

## 2020-09-09 ENCOUNTER — APPOINTMENT (OUTPATIENT)
Dept: INFUSION THERAPY | Facility: CLINIC | Age: 45
End: 2020-09-09

## 2020-09-09 RX ORDER — ACETAMINOPHEN 500 MG
650 TABLET ORAL ONCE
Refills: 0 | Status: COMPLETED | OUTPATIENT
Start: 2020-09-09 | End: 2020-09-09

## 2020-09-09 RX ORDER — IRON SUCROSE 20 MG/ML
200 INJECTION, SOLUTION INTRAVENOUS ONCE
Refills: 0 | Status: COMPLETED | OUTPATIENT
Start: 2020-09-09 | End: 2020-09-09

## 2020-09-09 RX ADMIN — IRON SUCROSE 220 MILLIGRAM(S): 20 INJECTION, SOLUTION INTRAVENOUS at 16:24

## 2020-09-09 RX ADMIN — Medication 650 MILLIGRAM(S): at 16:24

## 2020-09-09 RX ADMIN — Medication 650 MILLIGRAM(S): at 16:25

## 2020-09-16 ENCOUNTER — APPOINTMENT (OUTPATIENT)
Dept: INFUSION THERAPY | Facility: CLINIC | Age: 45
End: 2020-09-16

## 2020-09-16 RX ORDER — ACETAMINOPHEN 500 MG
650 TABLET ORAL ONCE
Refills: 0 | Status: COMPLETED | OUTPATIENT
Start: 2020-09-16 | End: 2020-09-16

## 2020-09-16 RX ORDER — IRON SUCROSE 20 MG/ML
200 INJECTION, SOLUTION INTRAVENOUS ONCE
Refills: 0 | Status: COMPLETED | OUTPATIENT
Start: 2020-09-16 | End: 2020-09-16

## 2020-09-16 RX ADMIN — IRON SUCROSE 200 MILLIGRAM(S): 20 INJECTION, SOLUTION INTRAVENOUS at 17:00

## 2020-09-16 RX ADMIN — Medication 650 MILLIGRAM(S): at 16:30

## 2020-09-16 RX ADMIN — IRON SUCROSE 220 MILLIGRAM(S): 20 INJECTION, SOLUTION INTRAVENOUS at 16:30

## 2020-09-24 ENCOUNTER — APPOINTMENT (OUTPATIENT)
Dept: INFUSION THERAPY | Facility: CLINIC | Age: 45
End: 2020-09-24

## 2020-09-24 ENCOUNTER — OUTPATIENT (OUTPATIENT)
Dept: OUTPATIENT SERVICES | Facility: HOSPITAL | Age: 45
LOS: 1 days | Discharge: HOME | End: 2020-09-24

## 2020-09-24 DIAGNOSIS — D50.9 IRON DEFICIENCY ANEMIA, UNSPECIFIED: ICD-10-CM

## 2020-09-24 DIAGNOSIS — E53.8 DEFICIENCY OF OTHER SPECIFIED B GROUP VITAMINS: ICD-10-CM

## 2020-09-24 RX ORDER — ACETAMINOPHEN 500 MG
650 TABLET ORAL ONCE
Refills: 0 | Status: COMPLETED | OUTPATIENT
Start: 2020-09-24 | End: 2020-09-24

## 2020-09-24 RX ORDER — IRON SUCROSE 20 MG/ML
200 INJECTION, SOLUTION INTRAVENOUS ONCE
Refills: 0 | Status: COMPLETED | OUTPATIENT
Start: 2020-09-24 | End: 2020-09-24

## 2020-09-24 RX ADMIN — Medication 650 MILLIGRAM(S): at 17:25

## 2020-09-24 RX ADMIN — IRON SUCROSE 200 MILLIGRAM(S): 20 INJECTION, SOLUTION INTRAVENOUS at 18:00

## 2020-09-24 RX ADMIN — Medication 650 MILLIGRAM(S): at 17:26

## 2020-09-24 RX ADMIN — IRON SUCROSE 220 MILLIGRAM(S): 20 INJECTION, SOLUTION INTRAVENOUS at 17:25

## 2020-11-17 ENCOUNTER — APPOINTMENT (OUTPATIENT)
Dept: HEMATOLOGY ONCOLOGY | Facility: CLINIC | Age: 45
End: 2020-11-17

## 2021-04-20 ENCOUNTER — OUTPATIENT (OUTPATIENT)
Dept: OUTPATIENT SERVICES | Facility: HOSPITAL | Age: 46
LOS: 1 days | Discharge: HOME | End: 2021-04-20

## 2021-04-20 ENCOUNTER — LABORATORY RESULT (OUTPATIENT)
Age: 46
End: 2021-04-20

## 2021-04-20 ENCOUNTER — APPOINTMENT (OUTPATIENT)
Dept: HEMATOLOGY ONCOLOGY | Facility: CLINIC | Age: 46
End: 2021-04-20
Payer: COMMERCIAL

## 2021-04-20 VITALS
HEIGHT: 61 IN | BODY MASS INDEX: 30.58 KG/M2 | DIASTOLIC BLOOD PRESSURE: 81 MMHG | WEIGHT: 162 LBS | RESPIRATION RATE: 14 BRPM | TEMPERATURE: 98.4 F | SYSTOLIC BLOOD PRESSURE: 127 MMHG | HEART RATE: 89 BPM

## 2021-04-20 DIAGNOSIS — D50.9 IRON DEFICIENCY ANEMIA, UNSPECIFIED: ICD-10-CM

## 2021-04-20 DIAGNOSIS — Z92.89 PERSONAL HISTORY OF OTHER MEDICAL TREATMENT: ICD-10-CM

## 2021-04-20 DIAGNOSIS — Z87.42 PERSONAL HISTORY OF OTHER DISEASES OF THE FEMALE GENITAL TRACT: ICD-10-CM

## 2021-04-20 DIAGNOSIS — E53.8 DEFICIENCY OF OTHER SPECIFIED B GROUP VITAMINS: ICD-10-CM

## 2021-04-20 LAB
HCT VFR BLD CALC: 34.9 %
HGB BLD-MCNC: 11.2 G/DL
MCHC RBC-ENTMCNC: 24.6 PG
MCHC RBC-ENTMCNC: 32.1 G/DL
MCV RBC AUTO: 76.7 FL
PLATELET # BLD AUTO: 247 K/UL
PMV BLD: 12 FL
RBC # BLD: 4.55 M/UL
RBC # FLD: 13.8 %
WBC # FLD AUTO: 5.93 K/UL

## 2021-04-20 PROCEDURE — 99213 OFFICE O/P EST LOW 20 MIN: CPT

## 2021-04-21 LAB
ALBUMIN SERPL ELPH-MCNC: 4.4 G/DL
ALP BLD-CCNC: 93 U/L
ALT SERPL-CCNC: 19 U/L
ANION GAP SERPL CALC-SCNC: 15 MMOL/L
AST SERPL-CCNC: 23 U/L
BILIRUB SERPL-MCNC: 0.3 MG/DL
BUN SERPL-MCNC: 11 MG/DL
CALCIUM SERPL-MCNC: 9.3 MG/DL
CHLORIDE SERPL-SCNC: 106 MMOL/L
CO2 SERPL-SCNC: 22 MMOL/L
CREAT SERPL-MCNC: 0.6 MG/DL
FERRITIN SERPL-MCNC: 4 NG/ML
FOLATE SERPL-MCNC: 10.3 NG/ML
GLUCOSE SERPL-MCNC: 79 MG/DL
IRON SATN MFR SERPL: 8 %
IRON SERPL-MCNC: 38 UG/DL
POTASSIUM SERPL-SCNC: 4.5 MMOL/L
PROT SERPL-MCNC: 6.9 G/DL
SODIUM SERPL-SCNC: 143 MMOL/L
TIBC SERPL-MCNC: 452 UG/DL
UIBC SERPL-MCNC: 414 UG/DL
VIT B12 SERPL-MCNC: 273 PG/ML

## 2021-04-27 ENCOUNTER — APPOINTMENT (OUTPATIENT)
Dept: INFUSION THERAPY | Facility: CLINIC | Age: 46
End: 2021-04-27

## 2021-04-27 RX ORDER — IRON SUCROSE 20 MG/ML
200 INJECTION, SOLUTION INTRAVENOUS ONCE
Refills: 0 | Status: COMPLETED | OUTPATIENT
Start: 2021-04-27 | End: 2021-04-27

## 2021-04-27 RX ORDER — ACETAMINOPHEN 500 MG
650 TABLET ORAL ONCE
Refills: 0 | Status: COMPLETED | OUTPATIENT
Start: 2021-04-27 | End: 2021-04-27

## 2021-04-27 RX ADMIN — Medication 650 MILLIGRAM(S): at 15:38

## 2021-04-27 RX ADMIN — IRON SUCROSE 220 MILLIGRAM(S): 20 INJECTION, SOLUTION INTRAVENOUS at 15:38

## 2021-04-27 RX ADMIN — IRON SUCROSE 200 MILLIGRAM(S): 20 INJECTION, SOLUTION INTRAVENOUS at 16:08

## 2021-05-04 ENCOUNTER — APPOINTMENT (OUTPATIENT)
Dept: INFUSION THERAPY | Facility: CLINIC | Age: 46
End: 2021-05-04

## 2021-05-04 RX ORDER — IRON SUCROSE 20 MG/ML
200 INJECTION, SOLUTION INTRAVENOUS ONCE
Refills: 0 | Status: COMPLETED | OUTPATIENT
Start: 2021-05-04 | End: 2021-05-04

## 2021-05-04 RX ORDER — ACETAMINOPHEN 500 MG
650 TABLET ORAL ONCE
Refills: 0 | Status: COMPLETED | OUTPATIENT
Start: 2021-05-04 | End: 2021-05-04

## 2021-05-04 RX ADMIN — Medication 650 MILLIGRAM(S): at 15:22

## 2021-05-04 RX ADMIN — Medication 650 MILLIGRAM(S): at 15:20

## 2021-05-04 RX ADMIN — IRON SUCROSE 110 MILLIGRAM(S): 20 INJECTION, SOLUTION INTRAVENOUS at 15:20

## 2021-05-09 NOTE — HISTORY OF PRESENT ILLNESS
[de-identified] : Radha has h/o iron deficiency and B12 deficiency, she is a former patient of Dr. Rivero and presents today for follow up visit. She has no new complaints today. As per Dr. Rivero's note she has had a negative GI evaluation in the past. On 8/3/2017 her CBC and iron studies, were WNL, B12 level was low. 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 She also reports heavy menstrual blood loss.  [de-identified] : 9/5/2017: Radha presents for follow up. She is a former patient of Dr. Rivero. She has h/o B12 deficiency and receives B12 injections.  \par \par 1/12/2018: Will repeat labwork today and offer iron supplementation with Venofer starting today, as I am suspecting she is again deficient in iron. She continues on B12 supplementation, she self-injects at home. \par \par 1/2/2/2019: Since her last visit Radha has received 5 doses of Venofer for iron deficiency. She also had several missed appointments. She reports she has not been injecting B12, just taking oral supplementation. She was out of syringes. She reports she has had extensive GI work up in the past and was told that she has problems with malabsorption. She reports shortness of breath, fatigue today. \par \par 11/5/19: Patient came for a follow up visit, she reports feeling tired, she reports heavy menstrual periods intermittently, they have been more pronounced lately. We communicated CBC from today with [facility] 10.3/33., MCV is 76.6, we will follow up on Ferritin level and vit B12 level, replete if needed. \par Patient also reported weight loss, she has followed keto and intermittent fasting for intentional weigh loss, lost around 20 pounds, she reports she has gained 5lbs back over past month, when she was not adherent to the diet. Her blood pressure, was noted to be higher than baseline today, she will touch base with her PMD ASAP. \par \par 8/24/2020: Patient here for follow up visit for anemia secondary to iron deficiency and B12 deficiency.  She recently started to have headaches, feel more fatigued and SOB at times.  Her periods have also been more frequent (every 14 days) last couple of months.  Patient denies cough, fever, chills, night sweats or bone pain.  She continues on B12 injections monthly at home.\par \par 4/20/21;Patient here for follow up visit for anemia secondary to iron deficiency and B12 deficiency.  She reports heart palpitation, tiredness,   headaches, feel more fatigued and SOB at times.  Her periods start to get lighter now. Patient denies cough, fever, chills, night sweats or bone pain.  She continues on B12 injections monthly at home.\par We reviewed CBC from today with [facility] is 11.2/34.9 MCV is 76.7.\par Patient will start on Venofer weekly x5.

## 2021-05-10 DIAGNOSIS — D50.9 IRON DEFICIENCY ANEMIA, UNSPECIFIED: ICD-10-CM

## 2021-05-10 DIAGNOSIS — E53.8 DEFICIENCY OF OTHER SPECIFIED B GROUP VITAMINS: ICD-10-CM

## 2021-05-10 DIAGNOSIS — Z92.89 PERSONAL HISTORY OF OTHER MEDICAL TREATMENT: ICD-10-CM

## 2021-05-10 DIAGNOSIS — Z87.42 PERSONAL HISTORY OF OTHER DISEASES OF THE FEMALE GENITAL TRACT: ICD-10-CM

## 2021-05-11 ENCOUNTER — APPOINTMENT (OUTPATIENT)
Dept: INFUSION THERAPY | Facility: CLINIC | Age: 46
End: 2021-05-11

## 2021-05-11 RX ORDER — IRON SUCROSE 20 MG/ML
200 INJECTION, SOLUTION INTRAVENOUS ONCE
Refills: 0 | Status: COMPLETED | OUTPATIENT
Start: 2021-05-11 | End: 2021-05-11

## 2021-05-11 RX ORDER — ACETAMINOPHEN 500 MG
650 TABLET ORAL ONCE
Refills: 0 | Status: COMPLETED | OUTPATIENT
Start: 2021-05-11 | End: 2021-05-11

## 2021-05-11 RX ADMIN — Medication 650 MILLIGRAM(S): at 15:37

## 2021-05-11 RX ADMIN — IRON SUCROSE 220 MILLIGRAM(S): 20 INJECTION, SOLUTION INTRAVENOUS at 15:37

## 2021-05-18 ENCOUNTER — APPOINTMENT (OUTPATIENT)
Dept: INFUSION THERAPY | Facility: CLINIC | Age: 46
End: 2021-05-18

## 2021-05-18 RX ORDER — ACETAMINOPHEN 500 MG
650 TABLET ORAL ONCE
Refills: 0 | Status: COMPLETED | OUTPATIENT
Start: 2021-05-18 | End: 2021-05-18

## 2021-05-18 RX ORDER — IRON SUCROSE 20 MG/ML
200 INJECTION, SOLUTION INTRAVENOUS ONCE
Refills: 0 | Status: COMPLETED | OUTPATIENT
Start: 2021-05-18 | End: 2021-05-18

## 2021-05-18 RX ADMIN — Medication 650 MILLIGRAM(S): at 15:27

## 2021-05-18 RX ADMIN — IRON SUCROSE 220 MILLIGRAM(S): 20 INJECTION, SOLUTION INTRAVENOUS at 15:27

## 2021-05-18 RX ADMIN — IRON SUCROSE 200 MILLIGRAM(S): 20 INJECTION, SOLUTION INTRAVENOUS at 16:03

## 2021-05-25 ENCOUNTER — APPOINTMENT (OUTPATIENT)
Dept: INFUSION THERAPY | Facility: CLINIC | Age: 46
End: 2021-05-25

## 2021-05-25 RX ORDER — ACETAMINOPHEN 500 MG
650 TABLET ORAL ONCE
Refills: 0 | Status: COMPLETED | OUTPATIENT
Start: 2021-05-25 | End: 2021-05-25

## 2021-05-25 RX ORDER — IRON SUCROSE 20 MG/ML
200 INJECTION, SOLUTION INTRAVENOUS ONCE
Refills: 0 | Status: COMPLETED | OUTPATIENT
Start: 2021-05-25 | End: 2021-05-25

## 2021-05-25 RX ADMIN — IRON SUCROSE 220 MILLIGRAM(S): 20 INJECTION, SOLUTION INTRAVENOUS at 15:32

## 2021-05-25 RX ADMIN — Medication 650 MILLIGRAM(S): at 15:32

## 2021-06-22 ENCOUNTER — APPOINTMENT (OUTPATIENT)
Dept: HEMATOLOGY ONCOLOGY | Facility: CLINIC | Age: 46
End: 2021-06-22

## 2021-06-24 ENCOUNTER — LABORATORY RESULT (OUTPATIENT)
Age: 46
End: 2021-06-24

## 2021-06-24 ENCOUNTER — OUTPATIENT (OUTPATIENT)
Dept: OUTPATIENT SERVICES | Facility: HOSPITAL | Age: 46
LOS: 1 days | Discharge: HOME | End: 2021-06-24

## 2021-06-24 ENCOUNTER — APPOINTMENT (OUTPATIENT)
Dept: HEMATOLOGY ONCOLOGY | Facility: CLINIC | Age: 46
End: 2021-06-24

## 2021-06-24 DIAGNOSIS — E53.8 DEFICIENCY OF OTHER SPECIFIED B GROUP VITAMINS: ICD-10-CM

## 2021-06-24 DIAGNOSIS — D50.9 IRON DEFICIENCY ANEMIA, UNSPECIFIED: ICD-10-CM

## 2021-06-25 LAB
FERRITIN SERPL-MCNC: 91 NG/ML
HCT VFR BLD CALC: 39.5 %
HGB BLD-MCNC: 13 G/DL
IRON SATN MFR SERPL: 22 %
IRON SERPL-MCNC: 68 UG/DL
MCHC RBC-ENTMCNC: 27 PG
MCHC RBC-ENTMCNC: 32.9 G/DL
MCV RBC AUTO: 82.1 FL
PLATELET # BLD AUTO: 220 K/UL
PMV BLD: 10.6 FL
RBC # BLD: 4.81 M/UL
RBC # FLD: 19.6 %
TIBC SERPL-MCNC: 314 UG/DL
UIBC SERPL-MCNC: 246 UG/DL
WBC # FLD AUTO: 6.47 K/UL

## 2021-10-19 ENCOUNTER — APPOINTMENT (OUTPATIENT)
Dept: HEMATOLOGY ONCOLOGY | Facility: CLINIC | Age: 46
End: 2021-10-19

## 2021-10-20 ENCOUNTER — APPOINTMENT (OUTPATIENT)
Dept: HEMATOLOGY ONCOLOGY | Facility: CLINIC | Age: 46
End: 2021-10-20